# Patient Record
Sex: MALE | Race: WHITE | Employment: FULL TIME | URBAN - NONMETROPOLITAN AREA
[De-identification: names, ages, dates, MRNs, and addresses within clinical notes are randomized per-mention and may not be internally consistent; named-entity substitution may affect disease eponyms.]

---

## 2017-11-07 ENCOUNTER — HOSPITAL ENCOUNTER (OUTPATIENT)
Age: 47
Discharge: HOME | End: 2017-11-07

## 2017-11-07 PROCEDURE — 93005 ELECTROCARDIOGRAM TRACING: CPT

## 2017-11-08 LAB
EKG ATRIAL RATE: 71 BPM
EKG P AXIS: 52 DEGREES
EKG P-R INTERVAL: 168 MS
EKG Q-T INTERVAL: 424 MS
EKG QRS DURATION: 92 MS
EKG QTC CALCULATION (BAZETT): 460 MS
EKG R AXIS: 43 DEGREES
EKG T AXIS: 47 DEGREES
EKG VENTRICULAR RATE: 71 BPM

## 2024-01-08 ENCOUNTER — HOSPITAL ENCOUNTER (EMERGENCY)
Age: 54
Discharge: HOME | End: 2024-01-08
Payer: COMMERCIAL

## 2024-01-08 VITALS
WEIGHT: 270 LBS | HEART RATE: 94 BPM | BODY MASS INDEX: 40.92 KG/M2 | OXYGEN SATURATION: 98 % | HEIGHT: 68 IN | TEMPERATURE: 98.1 F | DIASTOLIC BLOOD PRESSURE: 79 MMHG | SYSTOLIC BLOOD PRESSURE: 131 MMHG | RESPIRATION RATE: 16 BRPM

## 2024-01-08 DIAGNOSIS — S91.209A TRAUMATIC AVULSION OF NAIL PLATE OF TOE, INITIAL ENCOUNTER: Primary | ICD-10-CM

## 2024-01-08 PROCEDURE — 99203 OFFICE O/P NEW LOW 30 MIN: CPT

## 2024-01-08 RX ORDER — CEPHALEXIN 500 MG/1
500 CAPSULE ORAL 2 TIMES DAILY
Qty: 20 CAPSULE | Refills: 0 | Status: SHIPPED | OUTPATIENT
Start: 2024-01-08 | End: 2024-01-18

## 2024-01-08 ASSESSMENT — ENCOUNTER SYMPTOMS
TROUBLE SWALLOWING: 0
COUGH: 0
SORE THROAT: 0
EYE DISCHARGE: 0
DIARRHEA: 0
RHINORRHEA: 0
VOMITING: 0
NAUSEA: 0
EYE REDNESS: 0
SHORTNESS OF BREATH: 0

## 2024-01-08 ASSESSMENT — PAIN - FUNCTIONAL ASSESSMENT: PAIN_FUNCTIONAL_ASSESSMENT: NONE - DENIES PAIN

## 2024-01-08 NOTE — ED NOTES
Instructed pt to go to [facility] for post-accident drug screen.  Pt voiced understanding of this teaching.       Quan Contreras, RN  01/08/24 4546

## 2024-01-08 NOTE — ED TRIAGE NOTES
Pt to [facility] ambulatory with left foot pain/  Pain started on Saturday.  Redness present to left 2nd toe.

## 2024-01-08 NOTE — ED PROVIDER NOTES
[facility] URGENT CARE  Urgent Care Encounter      CHIEF COMPLAINT       Chief Complaint   Patient presents with    Foot Pain     left       Nurses Notes reviewed and I agree except as noted in the HPI.  HISTORY OF PRESENT ILLNESS   Florentino Corrales is a 53 y.o. male who presents with left toe pain on his second digit.  Patient states he caught it under a pallet several nights ago.  He noticed he was having increased pain and swelling in the toe last night.  He thinks he has a \"pocket of infection\" underneath the toenail.  Patient states that is difficult to walk due to the pain.  Present pain level is 5/10.    REVIEW OF SYSTEMS     Review of Systems   Constitutional:  Negative for chills, diaphoresis, fatigue and fever.   HENT:  Negative for congestion, ear pain, rhinorrhea, sore throat and trouble swallowing.    Eyes:  Negative for discharge and redness.   Respiratory:  Negative for cough and shortness of breath.    Cardiovascular:  Negative for chest pain.   Gastrointestinal:  Negative for diarrhea, nausea and vomiting.   Genitourinary:  Negative for decreased urine volume.   Musculoskeletal:  Negative for neck pain and neck stiffness.   Skin:  Negative for rash.   Neurological:  Negative for headaches.   Hematological:  Negative for adenopathy.   Psychiatric/Behavioral:  Negative for sleep disturbance.        PAST MEDICAL HISTORY         Diagnosis Date    RJ (acute kidney injury) (HCC)     JERRY (generalized anxiety disorder)     Hypertension     Morbid obesity (HCC)        SURGICAL HISTORY     Patient  has a past surgical history that includes Cholecystectomy and knee surgery.    CURRENT MEDICATIONS       Previous Medications    LISINOPRIL (PRINIVIL;ZESTRIL) 10 MG TABLET    TAKE ONE TABLET BY MOUTH ONCE DAILY    SERTRALINE (ZOLOFT) 50 MG TABLET    Take 1 tablet by mouth daily       ALLERGIES     Patient is has No Known Allergies.    FAMILY HISTORY     Patient'sfamily history includes Diabetes in his  RESULTS   Labs:No results found for this visit on 01/08/24.    IMAGING:  No orders to display      URGENT CARE COURSE:     Vitals:    01/08/24 1452   BP: 131/79   Pulse: 94   Resp: 16   Temp: 98.1 °F (36.7 °C)   TempSrc: Oral   SpO2: 98%   Weight: 122.5 kg (270 lb)   Height: 1.727 m (5' 8\")       Medications - No data to display  PROCEDURES:  None  FINAL IMPRESSION      1. Traumatic avulsion of nail plate of toe, initial encounter        DISPOSITION/PLAN   DISPOSITION Decision To Discharge 01/08/2024 03:12:29 PM    The results of pertinent diagnostic studies and exam findings were discussed with patient/patient representative.   Shared decision-making was performed and patient/patient representative are agreeable that they are suitable for discharge at this time.  The patient’s provisional diagnosis and plan of care were discussed with the patient/patient representative who expressed understanding.  The patient/representative is given strict written and verbal instructions about care at home, including over-the-counter management, prescription information, follow-up, and signs and symptoms of worsening of condition, and the patient/patient representative did verbalize understanding of these instructions.  Patient will go to nearest emergency department if symptoms change or worsen, or for any sign or symptom deemed emergent by the patient or family members. Follow up as an outpatient with PCP within the next 3 days, or sooner if symptoms warrant.     PATIENT REFERRED TO:  Smita Zaragoza APRN - CNP  [address] [facility] [address] [id number]  [phone]    In 1 week  If symptoms worsen    Seb Lowery DPM  [address] [id number] [phone]    Schedule an appointment as soon as possible for a visit in 1 day      DISCHARGE MEDICATIONS:  New Prescriptions    CEPHALEXIN (KEFLEX) 500 MG CAPSULE    Take 1 capsule by mouth 2 times daily for 10 days     Current Discharge Medication List

## 2024-01-08 NOTE — DISCHARGE INSTRUCTIONS
Take medications as prescribed.  You may use Tylenol or Motrin per package instructions for pain.  You may use a heating pad or ice for 15 minutes 2-3 times daily as needed for comfort.  Seek emergency treatment for fever greater than 101.5 for greater than 3 days, increased pain, or new or unusual symptoms.

## 2024-01-11 NOTE — ED NOTES
Pt calls in request work note. Explained we can only cover day of and day after. nOte left at  for pt      Liudmila Hurley RN  01/11/24 1643